# Patient Record
Sex: MALE | Race: OTHER | NOT HISPANIC OR LATINO | URBAN - METROPOLITAN AREA
[De-identification: names, ages, dates, MRNs, and addresses within clinical notes are randomized per-mention and may not be internally consistent; named-entity substitution may affect disease eponyms.]

---

## 2022-05-09 ENCOUNTER — EMERGENCY (EMERGENCY)
Facility: HOSPITAL | Age: 27
LOS: 0 days | Discharge: HOME | End: 2022-05-09
Attending: EMERGENCY MEDICINE | Admitting: EMERGENCY MEDICINE
Payer: MEDICAID

## 2022-05-09 VITALS
DIASTOLIC BLOOD PRESSURE: 76 MMHG | WEIGHT: 175.05 LBS | SYSTOLIC BLOOD PRESSURE: 124 MMHG | HEART RATE: 66 BPM | TEMPERATURE: 98 F | RESPIRATION RATE: 20 BRPM | OXYGEN SATURATION: 100 %

## 2022-05-09 DIAGNOSIS — R05.9 COUGH, UNSPECIFIED: ICD-10-CM

## 2022-05-09 DIAGNOSIS — F17.210 NICOTINE DEPENDENCE, CIGARETTES, UNCOMPLICATED: ICD-10-CM

## 2022-05-09 PROCEDURE — 71045 X-RAY EXAM CHEST 1 VIEW: CPT | Mod: 26

## 2022-05-09 PROCEDURE — 99284 EMERGENCY DEPT VISIT MOD MDM: CPT

## 2022-05-09 PROCEDURE — 74018 RADEX ABDOMEN 1 VIEW: CPT | Mod: 26

## 2022-05-09 NOTE — ED PROVIDER NOTE - PATIENT PORTAL LINK FT
You can access the FollowMyHealth Patient Portal offered by [facility] by registering at the following website: [email or web address]. By joining [employer]’s FollowMyHealth portal, you will also be able to view your health information using other applications (apps) compatible with our system.

## 2022-05-09 NOTE — ED PROVIDER NOTE - PROGRESS NOTE DETAILS
MQ: Will obtain chest xray and abdominal xray and reassess MQ: Cxr and abdominal xrays clear, will d/c to rehab

## 2022-05-09 NOTE — ED ADULT NURSE NOTE - CHIEF COMPLAINT QUOTE
patient coming from [address] [place] [address] for rehab - patient states he feels like hes withdrawing from opioids . last use last night

## 2022-05-09 NOTE — ED PROVIDER NOTE - ATTENDING CONTRIBUTION TO CARE
27-year-old male past medical history of opiate abuse, sent from [address] [place] [address] for x-ray.  Patient states he has had dry cough for a while.  Patient came with prescription from [address] [place] [address] to obtain chest x-ray to rule out TB and abx xr.  Patient states he has no medical complaints other than the cough.  No fever.  No chest pain shortness of breath.  No abdominal pain at this time.  No nausea vomiting diarrhea.  Tolerating p.o.  Patient on discharge will return to [address] [place] [address] to be admitted for rehab.    On exam, AFVSS, Well appearing, No acute distress, NCAT, EOMI, PERRLA, MMM, Neck supple, LCTAB, RRR nl s1s2 No mrg, Abdomen Soft NTND, AAOx3, No Focal Deficits, No LE edema or calf TTP,    A/P; cough, chest x-ray clear, Abdominal x-ray unremarkable, supportive care, DC back to [address] [place] [address] for rehab admission

## 2022-05-09 NOTE — ED PROVIDER NOTE - NS ED ROS FT
Review of Systems:  •	CONSTITUTIONAL - No fever, No diaphoresis, No weight change  •	SKIN - No rash  •	HEMATOLOGIC - No abnormal bleeding or bruising  •	EYES - No eye pain, No blurred vision  •	ENT - No change in hearing, No sore throat, No neck pain, No rhinorrhea, No ear pain  •	RESPIRATORY - No shortness of breath, + cough  •	CARDIAC -No chest pain, No palpitations  •	GI - No abdominal pain, No nausea, No vomiting, No diarrhea, No constipation, No bright red blood per rectum or melena. No flank pain  •                 - No dysuria, frequency, hematuria.   •	ENDO - No polydypsia, No polyuria, No heat/cold intolerance  •	MUSCULOSKELETAL - No joint paint, No swelling, No back pain  •	NEUROLOGIC - No numbness, No focal weakness, No headache, No dizziness  All other systems negative, unless specified in HPI

## 2022-05-09 NOTE — ED PROVIDER NOTE - CLINICAL SUMMARY MEDICAL DECISION MAKING FREE TEXT BOX
A/P; cough, chest x-ray clear, Abdominal x-ray unremarkable, supportive care, DC back to [address] [place] [address] for rehab admission

## 2022-05-09 NOTE — ED PROVIDER NOTE - NSFOLLOWUPINSTRUCTIONS_ED_ALL_ED_FT

## 2022-05-09 NOTE — ED PROVIDER NOTE - PHYSICAL EXAMINATION
CONSTITUTIONAL: in no acute distress, sitting on the chair comfortably  SKIN: warm, dry  HEAD: Normocephalic; atraumatic.  EYES: no conjunctival injection. PERRL.   ENT: No nasal discharge; airway clear.  NECK: Supple; non tender.  CARD: S1, S2 normal; no murmurs, gallops, or rubs. Regular rate and rhythm.   RESP: No wheezes, rales or rhonchi.  ABD: soft ntnd  EXT: Normal ROM.  No clubbing, cyanosis or edema.   LYMPH: No acute cervical adenopathy.  NEURO: Alert, oriented, grossly unremarkable  PSYCH: Cooperative, appropriate.

## 2022-05-09 NOTE — ED PROVIDER NOTE - OBJECTIVE STATEMENT
Patient is a 26 yo male with PMHx of opioid use disorder presenting for xrays today. Patient wants to be admitted to [address] [place], [place] [address]ab, but told to go to the ED for chest xray because patient has mild cough and abdominal xray because patient has hx of hiding pills in rectum. Denies fever, chills, chest pain, SOB, abdominal pain, n/v/d.

## 2022-05-09 NOTE — ED ADULT NURSE NOTE - OBJECTIVE STATEMENT
pt presents to ed for xrays. Pt has hx of opioid use and wants to go to [address] [place] [address] for rehab but was told he needs xrays first. Pt is awake and alert; denies any pain or discomfort

## 2022-05-16 ENCOUNTER — EMERGENCY (EMERGENCY)
Facility: HOSPITAL | Age: 27
LOS: 0 days | Discharge: HOME | End: 2022-05-16
Attending: EMERGENCY MEDICINE | Admitting: EMERGENCY MEDICINE
Payer: COMMERCIAL

## 2022-05-16 ENCOUNTER — EMERGENCY (EMERGENCY)
Facility: HOSPITAL | Age: 27
LOS: 0 days | Discharge: HOME | End: 2022-05-16
Admitting: EMERGENCY MEDICINE
Payer: COMMERCIAL

## 2022-05-16 VITALS
OXYGEN SATURATION: 100 % | TEMPERATURE: 98 F | HEART RATE: 74 BPM | DIASTOLIC BLOOD PRESSURE: 81 MMHG | SYSTOLIC BLOOD PRESSURE: 133 MMHG | RESPIRATION RATE: 20 BRPM | WEIGHT: 160.06 LBS

## 2022-05-16 VITALS
DIASTOLIC BLOOD PRESSURE: 78 MMHG | OXYGEN SATURATION: 99 % | TEMPERATURE: 98 F | HEART RATE: 72 BPM | RESPIRATION RATE: 18 BRPM | SYSTOLIC BLOOD PRESSURE: 129 MMHG

## 2022-05-16 DIAGNOSIS — R07.89 OTHER CHEST PAIN: ICD-10-CM

## 2022-05-16 DIAGNOSIS — F11.10 OPIOID ABUSE, UNCOMPLICATED: ICD-10-CM

## 2022-05-16 DIAGNOSIS — M25.512 PAIN IN LEFT SHOULDER: ICD-10-CM

## 2022-05-16 DIAGNOSIS — F41.9 ANXIETY DISORDER, UNSPECIFIED: ICD-10-CM

## 2022-05-16 DIAGNOSIS — R07.9 CHEST PAIN, UNSPECIFIED: ICD-10-CM

## 2022-05-16 DIAGNOSIS — F32.A DEPRESSION, UNSPECIFIED: ICD-10-CM

## 2022-05-16 DIAGNOSIS — F17.210 NICOTINE DEPENDENCE, CIGARETTES, UNCOMPLICATED: ICD-10-CM

## 2022-05-16 DIAGNOSIS — F17.200 NICOTINE DEPENDENCE, UNSPECIFIED, UNCOMPLICATED: ICD-10-CM

## 2022-05-16 PROCEDURE — 71046 X-RAY EXAM CHEST 2 VIEWS: CPT | Mod: 26

## 2022-05-16 PROCEDURE — L9981: CPT

## 2022-05-16 PROCEDURE — 73030 X-RAY EXAM OF SHOULDER: CPT | Mod: 26,LT

## 2022-05-16 PROCEDURE — 93010 ELECTROCARDIOGRAM REPORT: CPT

## 2022-05-16 PROCEDURE — 99284 EMERGENCY DEPT VISIT MOD MDM: CPT

## 2022-05-16 NOTE — ED PROVIDER NOTE - NS ED ATTENDING STATEMENT MOD
This was a shared visit with the KARENA. I reviewed and verified the documentation and independently performed the documented:

## 2022-05-16 NOTE — ED ADULT NURSE NOTE - OBJECTIVE STATEMENT
patient c.o intermittent rcw pain. Denies sob. states he had a recent uri. no cardiac hx. pt is a chronic smoker.

## 2022-05-16 NOTE — ED ADULT NURSE REASSESSMENT NOTE - NS ED NURSE REASSESS COMMENT FT1
jenny noland contacted at [address] [place] [address]. Patient to wait in waiting room for private car service. manager shirin made aware. charge rn facundo made aware.

## 2022-05-16 NOTE — ED PROVIDER NOTE - CARE PROVIDERS DIRECT ADDRESSES
[email or web address]

## 2022-05-16 NOTE — ED PROVIDER NOTE - OBJECTIVE STATEMENT
26 y/o male with a PMH of heroin abuse, anxiety, and depression presents to the ED for evaluation of right sided chest pain that feels like a punch, worse with palpation and improves at rest associated with left shoulder pain x 1 year. Patient reports he was living in [place] and was homeless and so has experienced pain since then. pt smokes cigarettes. pt denies fever, chills, cough, neck pain, jaw pain, back pain, abdominal pain, n/v/d/c, rashes, recent illness, recent sick contacts, recent travel, recent trauma, recent surgeries, recent hospitalizations, hx of cancer, use of hormones, weakness, numbness, or tingling.

## 2022-05-16 NOTE — ED PROVIDER NOTE - NS ED ROS FT
CONST: No fever, chills or bodyaches  EYES: No pain, redness, drainage or visual changes.  ENT: No ear pain or discharge, nasal discharge or congestion. No sore throat  CARD: (+) right chest pain, No palpitations  RESP: No SOB, cough, hemoptysis. No hx of asthma or COPD  GI: No abdominal pain, N/V/D  : No urinary symptoms  MS: No joint pain, back pain. (+) left shoulder pain  SKIN: No rashes  NEURO: No headache, dizziness, paresthesias or LOC

## 2022-05-16 NOTE — ED PROVIDER NOTE - PROGRESS NOTE DETAILS
Shared decision making utilized and HEART score discussed.  EKG and results discussed.  Pt given follow up and reports will follow up, smoking cessation discussed.

## 2022-05-16 NOTE — ED PROVIDER NOTE - CARE PROVIDER_API CALL
Monster Valenzuela)  Cardiovascular Disease; Nuclear Cardiology  [address] [place] [address], [address]  [place] [address]  Phone: [phone]  Fax: [phone]  Follow Up Time: Torrey Gonzalez)  Orthopaedic Surgery  [address] [place] [address]  Phone: [phone]  Fax: [phone]  Follow Up Time: 1-3 Days

## 2022-05-16 NOTE — ED PROVIDER NOTE - PATIENT PORTAL LINK FT
You can access the FollowMyHealth Patient Portal offered by [facility] by registering at the following website: [email or web address]. By joining [employer]’s FollowMyHealth portal, you will also be able to view your health information using other applications (apps) compatible with our system.

## 2022-05-16 NOTE — ED PROVIDER NOTE - NSFOLLOWUPCLINICS_GEN_ALL_ED_FT
[facility]  Medicine  [address], [place]   Phone: [phone]  Fax:   Follow Up Time: 1-3 Days

## 2022-05-16 NOTE — ED PROVIDER NOTE - NSFOLLOWUPINSTRUCTIONS_ED_ALL_ED_FT
Chest Pain    Chest pain can be caused by many different conditions which may or may not be dangerous. Causes include heartburn, lung infections, heart attack, blood clot in lungs, skin infections, strain or damage to muscle, cartilage, or bones, etc. In addition to a history and physical examination, an electrocardiogram (ECG) or other lab tests may have been performed to determine the cause of your chest pain. Follow up with your primary care provider or with a cardiologist as instructed.     SEEK IMMEDIATE MEDICAL CARE IF YOU HAVE ANY OF THE FOLLOWING SYMPTOMS: worsening chest pain, coughing up blood, unexplained back/neck/jaw pain, severe abdominal pain, dizziness or lightheadedness, fainting, shortness of breath, sweaty or clammy skin, vomiting, or racing heart beat. These symptoms may represent a serious problem that is an emergency. Do not wait to see if the symptoms will go away. Get medical help right away. Call 911 and do not drive yourself to the hospital.    Shoulder Pain    Many things can cause shoulder pain, including:    An injury to the area.  Overuse of the shoulder.  Arthritis.    The source of the pain can be:    Inflammation.  An injury to the shoulder joint.  An injury to a tendon, ligament, or bone.    HOME CARE INSTRUCTIONS  Take these actions to help with your pain:     Squeeze a soft ball or a foam pad as much as possible. This helps to keep the shoulder from swelling. It also helps to strengthen the arm.  Take over-the-counter and prescription medicines only as told by your health care provider.  If directed, apply ice to the area:  Put ice in a plastic bag.  Place a towel between your skin and the bag.  Leave the ice on for 20 minutes, 2–3 times per day. Stop applying ice if it does not help with the pain.  If you were given a shoulder sling or immobilizer:  Wear it as told.  Remove it to shower or bathe.  Move your arm as little as possible, but keep your hand moving to prevent swelling.    SEEK MEDICAL CARE IF:  Your pain gets worse.  Your pain is not relieved with medicines.  New pain develops in your arm, hand, or fingers.    SEEK IMMEDIATE MEDICAL CARE IF:  Your arm, hand, or fingers:  Tingle.  Become numb.  Become swollen.  Become painful.  Turn white or blue.    ADDITIONAL NOTES AND INSTRUCTIONS    Please follow up with your Primary MD in 24-48 hr.  Seek immediate medical care for any new/worsening signs or symptoms.

## 2022-05-16 NOTE — ED PROVIDER NOTE - CLINICAL SUMMARY MEDICAL DECISION MAKING FREE TEXT BOX
Patient is a good candidate to attempt outpatient management. Supportive care and home care discussed in detail. Patient aware they may have to return for re-evaluation  if outpatient treatment fails. Strict return precautions discussed.  States will follow up.

## 2022-05-16 NOTE — ED PROVIDER NOTE - ATTENDING APP SHARED VISIT CONTRIBUTION OF CARE
27-year-old male with past medical history of opiate abuse recently residing at rehab facility for heroin abuse for the past 2 weeks, anxiety, depression, presents for right-sided chest wall pain, feels like a punch, worse with palpation, better at rest as well as left shoulder pain for about a year.  Patient reports he was living in [place] and was homeless and so has experienced pain since then.  No direct trauma, no fall, pain to left shoulder is dull, radiating, intermittent, worse with movement, better at rest, did not take anything for symptoms.  Due to patient having symptoms rehab facility sent patient in.  Patient is a smoker.  Denies any known family history of cardiac disease including at his age.  no head trauma or loc, not on any blood thinners. denies fever, chills, n/v, csob, pleuritic chest pain, rib pain, neck pain/stiffness, back pain, abd pain, weakness, numbness/tingling, decreased sensation, hearing a click/feeling a pop, lacerations, abrasions, ecchymoses, or swelling.     on exam: pt sitting on stretcher uin nadr, no rash, no lacerations, abrasions, ecchymoses or swelling, no signs of basilar skull fracture, PERRL, EOM intact, no periorbital swelling/ecchymoses, mmm, neck supple, no spinous ttp to neck or back, FROM, no palpable shelves or step offs, regular rate, radial pulses 2/4 b/l, cap refIll< 2 seconds,  pain to palpation to R chest wall, ctabl w/ breath sounds present b/l, no wheezing or crackles,  no accessory muscle use, no tachypnea, no stridor, no pain to palpation to ribs b/l, no crepitus, no subq emphysema, bs present throughout all 4 quadrants, abd soft, nd, nt, no rebound tenderness or guarding, no cvat, no pain to palpation to shoulders with FROM of b/l shoulders-) crepitus, no obvious bony deformities, , no pain to palpation to clavicles, elbows, wrists, with no snuff box tenderness, AAOx3. Motor 5/5 and sensation intact throughout upper and lower ext. CN II-XII intact. No facial droop or slurring of speech. ambulating with no ataxia or difficulty. GCS 15.    Plan: EKG, CXR, shoulder xray, pt keeps going outside to smoke, does not want anything for pain, eager to leave but facility will not have him return until he can be discharged with transport.

## 2022-05-17 DIAGNOSIS — Z02.9 ENCOUNTER FOR ADMINISTRATIVE EXAMINATIONS, UNSPECIFIED: ICD-10-CM

## 2022-05-18 ENCOUNTER — EMERGENCY (EMERGENCY)
Facility: HOSPITAL | Age: 27
LOS: 0 days | Discharge: HOME | End: 2022-05-18
Attending: EMERGENCY MEDICINE | Admitting: EMERGENCY MEDICINE
Payer: MEDICAID

## 2022-05-18 VITALS
DIASTOLIC BLOOD PRESSURE: 89 MMHG | RESPIRATION RATE: 18 BRPM | HEART RATE: 80 BPM | SYSTOLIC BLOOD PRESSURE: 132 MMHG | TEMPERATURE: 97 F | HEIGHT: 72 IN | OXYGEN SATURATION: 100 % | WEIGHT: 164.91 LBS

## 2022-05-18 DIAGNOSIS — F17.210 NICOTINE DEPENDENCE, CIGARETTES, UNCOMPLICATED: ICD-10-CM

## 2022-05-18 DIAGNOSIS — R07.89 OTHER CHEST PAIN: ICD-10-CM

## 2022-05-18 DIAGNOSIS — F11.10 OPIOID ABUSE, UNCOMPLICATED: ICD-10-CM

## 2022-05-18 PROBLEM — Z00.00 ENCOUNTER FOR PREVENTIVE HEALTH EXAMINATION: Status: ACTIVE | Noted: 2022-05-18

## 2022-05-18 PROCEDURE — 93010 ELECTROCARDIOGRAM REPORT: CPT

## 2022-05-18 PROCEDURE — 99284 EMERGENCY DEPT VISIT MOD MDM: CPT

## 2022-05-18 RX ORDER — IBUPROFEN 200 MG
600 TABLET ORAL ONCE
Refills: 0 | Status: COMPLETED | OUTPATIENT
Start: 2022-05-18 | End: 2022-05-18

## 2022-05-18 RX ADMIN — Medication 600 MILLIGRAM(S): at 04:05

## 2022-05-18 NOTE — ED PROVIDER NOTE - PATIENT PORTAL LINK FT
You can access the FollowMyHealth Patient Portal offered by [facility] by registering at the following website: [email or web address]. By joining [employer]’s FollowMyHealth portal, you will also be able to view your health information using other applications (apps) compatible with our system.

## 2022-05-18 NOTE — ED ADULT NURSE NOTE - CHIEF COMPLAINT QUOTE
Patient complaining of R sided chest pain. Was seen for same complaint a few days ago and denies any other symptom. Patient reports pain travels to R shoulder blade. Patient is in no acute distress and states all prior EKG's have been normal.

## 2022-05-18 NOTE — ED PROVIDER NOTE - OBJECTIVE STATEMENT
Patient is a 27 year old male with history of polysubstance use who presents with R sided chest discomfort.    Patient reports that he has been to the ED several times recently for similar problems but is worried as he still is experiencing chest discomfort when he tries to sleep and it worries him. He feels this discomfort sometimes on R side and sometimes on L side and sometimes he can feel it in his back. Reports history of chronic L shoulder pain. He denies any injuries or SOB. He reports taking some Motrin this morning which helped reduce the pain but he can still feel it. He used to smoke 1 pack/day of cigarettes but has not been smoking any in rehab. He is currently on wellbutrin, gabapentin, and benadryl. Denies family hx of heart disease. Patient is a 27 year old male with history of polysubstance use who presents with R sided chest discomfort. Patient stepped outside to smoke multiple times prior to being seen.    Patient reports that he has been to the ED several times recently for similar problems but is worried as he still is experiencing chest discomfort when he tries to sleep and it worries him. He feels this discomfort sometimes on R side and sometimes on L side and sometimes he can feel it in his back. Reports history of chronic L shoulder pain. He denies any injuries or SOB. He reports taking some Motrin this morning which helped reduce the pain but he can still feel it. He used to smoke 1 pack/day of cigarettes but has not been allowed to smoke at all in rehab. Does not wear a nicotine patch due to concerns about nightmares, declines patch today. He is currently on wellbutrin, gabapentin, and benadryl. Denies family hx of heart disease.

## 2022-05-18 NOTE — ED PROVIDER NOTE - NSFOLLOWUPINSTRUCTIONS_ED_ALL_ED_FT
Chest Pain    WHAT YOU NEED TO KNOW:    Chest pain can be caused by a range of conditions, from not serious to life-threatening. Chest pain can be a symptom of a digestive problem, such as acid reflux or a stomach ulcer. An anxiety attack or a strong emotion, such as anger, can also cause chest pain. Infection, inflammation, or a fracture in the bones or cartilage in your chest can cause pain or discomfort. Sometimes chest pain or pressure is caused by poor blood flow to your heart (angina). Chest pain may also be caused by life-threatening conditions such as a heart attack or blood clot in your lungs.    DISCHARGE INSTRUCTIONS:    Call your local emergency number (911 in the ) or have someone call if:   •You have any of the following signs of a heart attack: ?Squeezing, pressure, or pain in your chest      ?You may also have any of the following: ?Discomfort or pain in your back, neck, jaw, stomach, or arm      ?Shortness of breath      ?Nausea or vomiting      ?Lightheadedness or a sudden cold sweat            Seek care immediately if:   •You have chest discomfort that gets worse, even with medicine.      •You cough or vomit blood.      •Your bowel movements are black or bloody.      •You cannot stop vomiting, or it hurts to swallow.      Call your doctor if:   •You have questions or concerns about your condition or care.          Medicines:   •Medicines may be given to treat the cause of your chest pain. Examples include pain medicine, anxiety medicine, or medicines to increase blood flow to your heart.      •Do not take certain medicines without asking your healthcare provider first. These include NSAIDs, herbal or vitamin supplements, and hormones, such as estrogen or progestin.      •Take your medicine as directed. Contact your healthcare provider if you think your medicine is not helping or if you have side effects. Tell him or her if you are allergic to any medicine. Keep a list of the medicines, vitamins, and herbs you take. Include the amounts, and when and why you take them. Bring the list or the pill bottles to follow-up visits. Carry your medicine list with you in case of an emergency.      Healthy living tips: If the cause of your chest pain is known, your healthcare provider will give you specific guidelines to follow. The following are general healthy guidelines:  •Do not smoke. Nicotine and other chemicals in cigarettes and cigars can cause lung and heart damage. Ask your healthcare provider for information if you currently smoke and need help to quit. E-cigarettes or smokeless tobacco still contain nicotine. Talk to your healthcare provider before you use these products.      •Choose a variety of healthy foods as often as possible. Include fresh, frozen, or canned fruits and vegetables. Also include low-fat dairy products, fish, chicken (without skin), and lean meats. Your healthcare provider or a dietitian can help you create meal plans. You may need to avoid certain foods or drinks if your pain is caused by a digestion problem.  Healthy Foods           •Lower your sodium (salt) intake. Limit foods that are high in sodium, such as canned foods, salty snacks, and cold cuts. If you add salt when you cook food, do not add more at the table. Choose low-sodium canned foods as much as possible.             •Drink plenty of water every day. Water helps your body to control temperature and blood pressure. Ask your healthcare provider how much water you should drink every day.      •Ask about activity. Your healthcare provider will tell you which activities to limit or avoid. Ask when you can drive, return to work, and have sex. Ask about the best exercise plan for you.      •Maintain a healthy weight. Ask your healthcare provider what a healthy weight is for you. Ask him or her to help you create a safe weight loss plan if you are overweight.      •Ask about vaccines you may need. Your healthcare provider can tell you which vaccines you need, and when to get them. The following vaccines help prevent diseases that can become serious for a person with a heart condition:?The influenza (flu) vaccine is given each year. Get a flu vaccine as soon as recommended, usually in September or October.      ?The pneumonia vaccine is usually given every 5 years. Your healthcare provider may recommend the pneumonia vaccine if you are 65 or older.      ?COVID-19 vaccines are given to adults as a shot in 1 or 2 doses.   Vaccination is recommended for all adults. A booster (additional) dose is also recommended for all adults. A booster helps your immune system continue to protect against severe COVID-19. The booster can be a different brand of the COVID-19 vaccine than you originally received. The timing for the booster depends on the type of vaccine you received:?1-dose vaccine: The booster is given at least 2 months after you received the vaccine.      ?2-dose vaccine: The booster is given at least 5 to 6 months after the second dose. A second booster is recommended for all adults 50 or older and for immunocompromised adults 18 or older. Your healthcare provider will tell you when to get this booster.        Prevent Heart Disease          Follow up with your doctor within 72 hours, or as directed: You may need to return for more tests to find the cause of your chest pain. You may be referred to a specialist, such as a cardiologist or gastroenterologist. Write down your questions so you remember to ask them during your visits. Chest Pain    WHAT YOU NEED TO KNOW:    You may take up to 600 mg ibuprofen every 6 hours as needed for chest wall pain.    Chest pain can be caused by a range of conditions, from not serious to life-threatening. Chest pain can be a symptom of a digestive problem, such as acid reflux or a stomach ulcer. An anxiety attack or a strong emotion, such as anger, can also cause chest pain. Infection, inflammation, or a fracture in the bones or cartilage in your chest can cause pain or discomfort. Sometimes chest pain or pressure is caused by poor blood flow to your heart (angina). Chest pain may also be caused by life-threatening conditions such as a heart attack or blood clot in your lungs.    DISCHARGE INSTRUCTIONS:    Call your local emergency number (911 in the [place]) or have someone call if:   •You have any of the following signs of a heart attack: ?Squeezing, pressure, or pain in your chest      ?You may also have any of the following: ?Discomfort or pain in your back, neck, jaw, stomach, or arm      ?Shortness of breath      ?Nausea or vomiting      ?Lightheadedness or a sudden cold sweat            Seek care immediately if:   •You have chest discomfort that gets worse, even with medicine.      •You cough or vomit blood.      •Your bowel movements are black or bloody.      •You cannot stop vomiting, or it hurts to swallow.      Call your doctor if:   •You have questions or concerns about your condition or care.          Medicines:   •Medicines may be given to treat the cause of your chest pain. Examples include pain medicine, anxiety medicine, or medicines to increase blood flow to your heart.      •Do not take certain medicines without asking your healthcare provider first. These include NSAIDs, herbal or vitamin supplements, and hormones, such as estrogen or progestin.      •Take your medicine as directed. Contact your healthcare provider if you think your medicine is not helping or if you have side effects. Tell him or her if you are allergic to any medicine. Keep a list of the medicines, vitamins, and herbs you take. Include the amounts, and when and why you take them. Bring the list or the pill bottles to follow-up visits. Carry your medicine list with you in case of an emergency.      Healthy living tips: If the cause of your chest pain is known, your healthcare provider will give you specific guidelines to follow. The following are general healthy guidelines:  •Do not smoke. Nicotine and other chemicals in cigarettes and cigars can cause lung and heart damage. Ask your healthcare provider for information if you currently smoke and need help to quit. E-cigarettes or smokeless tobacco still contain nicotine. Talk to your healthcare provider before you use these products.      •Choose a variety of healthy foods as often as possible. Include fresh, frozen, or canned fruits and vegetables. Also include low-fat dairy products, fish, chicken (without skin), and lean meats. Your healthcare provider or a dietitian can help you create meal plans. You may need to avoid certain foods or drinks if your pain is caused by a digestion problem.  Healthy Foods           •Lower your sodium (salt) intake. Limit foods that are high in sodium, such as canned foods, salty snacks, and cold cuts. If you add salt when you cook food, do not add more at the table. Choose low-sodium canned foods as much as possible.             •Drink plenty of water every day. Water helps your body to control temperature and blood pressure. Ask your healthcare provider how much water you should drink every day.      •Ask about activity. Your healthcare provider will tell you which activities to limit or avoid. Ask when you can drive, return to work, and have sex. Ask about the best exercise plan for you.      •Maintain a healthy weight. Ask your healthcare provider what a healthy weight is for you. Ask him or her to help you create a safe weight loss plan if you are overweight.      •Ask about vaccines you may need. Your healthcare provider can tell you which vaccines you need, and when to get them. The following vaccines help prevent diseases that can become serious for a person with a heart condition:?The influenza (flu) vaccine is given each year. Get a flu vaccine as soon as recommended, usually in September or October.      ?The pneumonia vaccine is usually given every 5 years. Your healthcare provider may recommend the pneumonia vaccine if you are 65 or older.      ?COVID-19 vaccines are given to adults as a shot in 1 or 2 doses.   Vaccination is recommended for all adults. A booster (additional) dose is also recommended for all adults. A booster helps your immune system continue to protect against severe COVID-19. The booster can be a different brand of the COVID-19 vaccine than you originally received. The timing for the booster depends on the type of vaccine you received:?1-dose vaccine: The booster is given at least 2 months after you received the vaccine.      ?2-dose vaccine: The booster is given at least 5 to 6 months after the second dose. A second booster is recommended for all adults 50 or older and for immunocompromised adults 18 or older. Your healthcare provider will tell you when to get this booster.        Prevent Heart Disease          Follow up with your doctor within 72 hours, or as directed: You may need to return for more tests to find the cause of your chest pain. You may be referred to a specialist, such as a cardiologist or gastroenterologist. Write down your questions so you remember to ask them during your visits.

## 2022-05-18 NOTE — ED PROVIDER NOTE - PHYSICAL EXAMINATION
LOS:     VITALS:   T(C): 36.2 (05-18-22 @ 02:18), Max: 36.2 (05-18-22 @ 02:18)  HR: 80 (05-18-22 @ 02:18) (80 - 80)  BP: 132/89 (05-18-22 @ 02:18) (132/89 - 132/89)  RR: 18 (05-18-22 @ 02:18) (18 - 18)  SpO2: 100% (05-18-22 @ 02:18) (100% - 100%)    GENERAL: NAD, anxious appearing  HEAD:  Atraumatic, Normocephalic  EYES: EOMI, PERRLA, conjunctiva and sclera clear  ENT: Moist mucous membranes  NECK: Supple, No JVD  CHEST/LUNG: Clear to auscultation bilaterally; No rales, rhonchi, wheezing, or rubs. Unlabored respirations  HEART: Regular rate and rhythm; No murmurs, rubs, or gallops  ABDOMEN: BSx4; Soft, nontender, nondistended  EXTREMITIES:  2+ Peripheral Pulses, brisk capillary refill. No clubbing, cyanosis, or edema  NERVOUS SYSTEM: mild tenderness to palpation over R pectoral muscle; A&Ox3, no focal deficits   SKIN: No rashes or lesions

## 2022-05-18 NOTE — ED ADULT TRIAGE NOTE - CHIEF COMPLAINT QUOTE
Patient complaining of R sided chest pain. Was seen for same complaint a few days ago. Patient reports pain travels to R shoulder blade. Patient complaining of R sided chest pain. Was seen for same complaint a few days ago and denies any other symptom. Patient reports pain travels to R shoulder blade. Patient is in no acute distress and states all prior EKG's have been normal.

## 2022-05-18 NOTE — ED PROVIDER NOTE - NS ED ROS FT
REVIEW OF SYSTEMS:    CONSTITUTIONAL: No weakness, fevers or chills  EYES/ENT: No visual changes;  No vertigo or throat pain   NECK: No pain or stiffness  RESPIRATORY: No cough, wheezing, hemoptysis; No shortness of breath  CARDIOVASCULAR: + R chest wall pain; no palpitations  GASTROINTESTINAL: No abdominal or epigastric pain. No nausea, vomiting, or hematemesis; No diarrhea or constipation. No melena or hematochezia.  GENITOURINARY: No dysuria, frequency or hematuria  NEUROLOGICAL: No numbness or weakness  SKIN: No itching, burning, rashes, or lesions   All other review of systems is negative unless indicated above.

## 2022-05-18 NOTE — ED PROVIDER NOTE - ATTENDING CONTRIBUTION TO CARE
3-year-old male history of drug abuse 1 pack a day smoker quit 20 days ago states he is having right-sided chest pain.  Here yesterday for same thing had normal EKG and chest x-ray here states that he is right side on the right sternal border sharp its worse when he lays on his right side.  No shortness of breath fever chills.    nad, ctaqb, rrr, ab soft,. nt. no cvat. ttp right sternal border.  ekg non ischemic.

## 2022-05-29 ENCOUNTER — EMERGENCY (EMERGENCY)
Facility: HOSPITAL | Age: 27
LOS: 0 days | Discharge: HOME | End: 2022-05-29
Attending: EMERGENCY MEDICINE | Admitting: EMERGENCY MEDICINE
Payer: MEDICAID

## 2022-05-29 VITALS
SYSTOLIC BLOOD PRESSURE: 134 MMHG | TEMPERATURE: 97 F | HEIGHT: 72 IN | WEIGHT: 179.9 LBS | OXYGEN SATURATION: 99 % | HEART RATE: 97 BPM | RESPIRATION RATE: 18 BRPM | DIASTOLIC BLOOD PRESSURE: 80 MMHG

## 2022-05-29 DIAGNOSIS — F32.A DEPRESSION, UNSPECIFIED: ICD-10-CM

## 2022-05-29 DIAGNOSIS — R07.89 OTHER CHEST PAIN: ICD-10-CM

## 2022-05-29 DIAGNOSIS — Z87.891 PERSONAL HISTORY OF NICOTINE DEPENDENCE: ICD-10-CM

## 2022-05-29 DIAGNOSIS — Z59.01 SHELTERED HOMELESSNESS: ICD-10-CM

## 2022-05-29 DIAGNOSIS — F41.9 ANXIETY DISORDER, UNSPECIFIED: ICD-10-CM

## 2022-05-29 PROCEDURE — 93010 ELECTROCARDIOGRAM REPORT: CPT

## 2022-05-29 PROCEDURE — 99284 EMERGENCY DEPT VISIT MOD MDM: CPT

## 2022-05-29 SDOH — ECONOMIC STABILITY - HOUSING INSECURITY: SHELTERED HOMELESSNESS: Z59.01

## 2022-05-29 NOTE — ED PROVIDER NOTE - NSFOLLOWUPCLINICS_GEN_ALL_ED_FT
[facility]  [facility]  [address] [place] [id number]  Phone: [phone]  Fax:   Follow Up Time: 4-6 Days

## 2022-05-29 NOTE — ED PROVIDER NOTE - PROGRESS NOTE DETAILS
ED Attending CHITRA Vyas  Patient will follow up with his physician who prescribed him albuterol and discussing this medication with the physician.  Patient comfortable would like to go home.  Aware of EKG.  Aware signs and symptoms to return for.  Reports will follow up.

## 2022-05-29 NOTE — ED PROVIDER NOTE - OBJECTIVE STATEMENT
27M presents with chest pressure x 4 days, patient notes it seems to have started after taking wellbutrin, denies syncope/sob/vomiting/diaphoresis, former smoker, denies any family hx of cardiac disease.

## 2022-05-29 NOTE — ED PROVIDER NOTE - PHYSICAL EXAMINATION
Vital Signs: I have reviewed the initial vital signs.  Constitutional: appears stated age, no acute distress.  HEENT: Airway patent, moist MM, no erythema/swelling/deformity of oral structures. EOMI, PERRLA.  CV: regular rate, regular rhythm, well-perfused extremities, 2+ b/l DP and radial pulses equal.  Lungs: BCTA, no increased WOB.  ABD: NTND, no guarding or rebound, no pulsatile mass, no hernias, no flank pain.   MSK: Neck supple, nontender, nl ROM, no stepoff. Chest nontender. Back nontender in TLS spine or to b/l bony structures. Ext nontender, nl rom, no deformity.   INTEG: Skin warm, dry, no rash.  NEURO: A&Ox3, moving all extremities, normal speech  PSYCH: Calm, cooperative, normal affect and interaction.

## 2022-05-29 NOTE — ED PROVIDER NOTE - PATIENT PORTAL LINK FT
You can access the FollowMyHealth Patient Portal offered by [facility] by registering at the following website: [email or web address]. By joining [employer]’s FollowMyHealth portal, you will also be able to view your health information using other applications (apps) compatible with our system.

## 2022-05-29 NOTE — ED PROVIDER NOTE - CLINICAL SUMMARY MEDICAL DECISION MAKING FREE TEXT BOX
Supportive care and home care discussed in detail. Patient aware they may have to return for re-evaluation and possible admission if outpatient treatment fails. Strict return precautions discussed.  Shared decision making utilized and HEART score discussed.  Patient electing for outpatient management and will follow up immediately with cardiology.  Risk of MACE discussed and patient understands this risk. Strict return precautions discussed and patient aware they can return at any time for re-evaluation and possible admission. EKG and results discussed.  Will follow up as discussed.

## 2022-05-29 NOTE — ED PROVIDER NOTE - ATTENDING CONTRIBUTION TO CARE
27-year-old male with past medical history of opiate abuse recently residing at rehab facility for heroin abuse for the past 2 weeks, anxiety, depression, presents for right-sided chest wall pain, feels like a punch, worse with palpation, better at rest. Patient reports he was living in [place] and was homeless and so has experienced pain since then.  No direct trauma, no fall, pain is non  radiating, intermittent, worse with movement, better at rest, did not take anything for symptoms. believes it is due to his Wellbutrin, pt has been here multiple times for symptoms.  Patient is a smoker.  Denies any known family history of cardiac disease including at his age.  no head trauma or loc, not on any blood thinners. denies fever, chills, n/v, sob, pleuritic chest pain, rib pain, neck pain/stiffness, back pain, abd pain, weakness, numbness/tingling, decreased sensation, hearing a click/feeling a pop, lacerations, abrasions, ecchymoses, or swelling.      on exam: pt sitting on stretcher in nad, no rash, no lacerations, abrasions, ecchymoses or swelling, no signs of basilar skull fracture, PERRL, EOM intact, no periorbital swelling/ecchymoses, mmm, neck supple, no spinous ttp to neck or back, FROM, no palpable shelves or step offs, regular rate, radial pulses 2/4 b/l, cap refIll< 2 seconds,  no pain to palpation chest wall, ctabl w/ breath sounds present b/l, no wheezing or crackles,  no accessory muscle use, no tachypnea, no stridor, no pain to palpation to ribs b/l, no crepitus, no subq emphysema, bs present throughout all 4 quadrants, abd soft, nd, nt, no rebound tenderness or guarding, no cvat, no pain to palpation to shoulders with FROM of b/l shoulders-) crepitus, no obvious bony deformities, , no pain to palpation to clavicles, elbows, wrists, with no snuff box tenderness, AAOx3. Motor 5/5 and sensation intact throughout upper and lower ext. CN II-XII intact. No facial droop or slurring of speech. ambulating with no ataxia or difficulty. GCS 15.    Plan: EKG, pt has had multiple cxr's does not want another, wanted to know his ekg results and leave.

## 2022-06-08 ENCOUNTER — APPOINTMENT (OUTPATIENT)
Dept: CARDIOLOGY | Facility: CLINIC | Age: 27
End: 2022-06-08

## 2022-06-15 ENCOUNTER — APPOINTMENT (OUTPATIENT)
Dept: ORTHOPEDIC SURGERY | Facility: CLINIC | Age: 27
End: 2022-06-15

## 2022-11-22 NOTE — ED ADULT NURSE NOTE - NS ED NURSE LEVEL OF CONSCIOUSNESS MENTAL STATUS
[FreeTextEntry2] : follow up for right ankle pain  feels 85% better still reports little swelling icing it therapy is now 1 week still doing walking
Awake/Alert

## 2024-04-02 NOTE — ED ADULT NURSE NOTE - IN THE PAST 12 MONTHS HAVE YOU USED DRUGS OTHER THAN THOSE REQUIRED FOR MEDICAL REASON?
Adult)   Ht 1.778 m (5' 10\")   Wt 101.8 kg (224 lb 6.4 oz)   BMI 32.20 kg/m²   24HR INTAKE/OUTPUT:    [unfilled]  [unfilled]      CONSTITUTIONAL:  alert, no apparent distress and mildly obese  EYES:  PERRL, sclera clear  ENT:  Normocephalic,atraumatic, without obvious abnormality  NECK:  supple, symmetrical, trachea midline  LUNGS: Resp effort easy and unlabored, clear to auscultation  CARDIOVASCULAR:  NO JVD, regular rate and rhythm and no murmur noted  ABDOMEN:  normal bowel sounds, soft, mild distension, non-tender, involuntary guarding absent, no masses palpated and hernia absent  MUSCULOSKELETAL: No clubbing or cyanosis, 0+ pitting edema lower extremities  NEUROLOGIC:  Mental Status Exam:  Level of Alertness:   awake  PSYCHIATRIC:   person, place, time  SKIN:  Body Locations:  Right lower extremity:  abnormal skin lesion  Left lower extremity:  abnormal skin lesion    DATA:    CBC: No results for input(s): \"WBC\", \"HGB\", \"HCT\", \"PLT\" in the last 72 hours.  BMP:  No results for input(s): \"NA\", \"K\", \"CL\", \"CO2\", \"BUN\", \"CREATININE\", \"GLUCOSE\" in the last 72 hours.  Hepatic: No results for input(s): \"AST\", \"ALT\", \"ALB\", \"BILITOT\", \"ALKPHOS\" in the last 72 hours.  Mag:    No results for input(s): \"MG\" in the last 72 hours.   Phos:   No results for input(s): \"PHOS\" in the last 72 hours.   INR: No results for input(s): \"INR\" in the last 72 hours.    Radiology Review: Images personally reviewed by me.         IMPRESSION/RECOMMENDATIONS:  Skin biopsy  -Patient presented to clinic with suspicion for squamous cell carcinoma given history  -Punch biopsy was performed in office  -Patient will be contacted with results with results dictating further follow-up    Electronically signed by Jase Greenfield DO     [facility]    Surgery Staff    I have examined this patient, and read and agree with the note by Christopher Medley, DO from today; more than half of the total time was spent by me on the 
No